# Patient Record
Sex: FEMALE | Race: WHITE | NOT HISPANIC OR LATINO | URBAN - METROPOLITAN AREA
[De-identification: names, ages, dates, MRNs, and addresses within clinical notes are randomized per-mention and may not be internally consistent; named-entity substitution may affect disease eponyms.]

---

## 2017-04-26 ENCOUNTER — TELEPHONE (OUTPATIENT)
Dept: TRANSPLANT | Facility: CLINIC | Age: 37
End: 2017-04-26

## 2017-04-27 NOTE — TELEPHONE ENCOUNTER
Left message for Gema telling her we received her questionaire. Large response for this recip and need to carefully go through everyone. Once we need to proceed with Gema we will contact her. Has hx Gastric Bypass. No pkt sent.

## 2017-05-03 ENCOUNTER — TELEPHONE (OUTPATIENT)
Dept: TRANSPLANT | Facility: CLINIC | Age: 37
End: 2017-05-03

## 2017-05-03 NOTE — TELEPHONE ENCOUNTER
"Living Kidney Donor Evaluation Completed: 2017 20:59:51 CT Updated: 2017 10:29:27 CT  Donor Name: Gema Jennings MRN: Note: : 1980 Age: 36Gender: Female Donor Height: 5  7\" Weight (lb): 175 BMI: 27.4  Donor Race:  Ethnicity: Not / Donor Preferred Language: English  Required?: No Current Marital Status:   Demographics: Home Address: [address] [place] [address] City: [place] State: [place] Zip: [address] Country: [place]  Best Phone: [phone] Alt Phone: [phone] Donor Email: [email or web address] Phone Type: Mobile Alt Phone Type: Work - Private  Preferred Contact Time(s): 09:00 AM-11:00 AM Preferred Contact Day(s): Mon, Tue, Wed, Thur, Fri  Donor Screen: PASSED Donor Referred by: Social Media Donor self reported ABO: O  Recipient Information: Recipient Name (Last, First): Jonathan Castaneda Recipient :    2004  ... Donor Relationship: Met through social media Recipient Diagnosis: Recipient ABO:   MEDICAL HISTORY:  Asthma ( no Intubations, no Hospitalizations, no Steroid Use )  Depression  Insomnia  Migraine Headaches  MEDICATIONS:  Albuterol Inhaler  Lunesta  Multivitamin with Iron  Topiramate  Zoloft  SURGICAL HISTORY:  Gastric Bypass, NOS  Oral Surgery, NOS  Tubal Ligation  ALLERGIES:  NKDA  SOCIAL HISTORY:  EtOH: Rare (1-2 drinks/year)  Illicit Drug Use: Denies  Tobacco: Denies  SELF-REPORTED FUNCTIONAL STATUS:  \"I am able to participate in strenuous sports such as swimming, singles tennis, football, basketball, or skiing\"  Exercise (3 X per week)  REVIEW OF ORGAN SYSTEMS: Airway or Lungs: Yes Blood Disorder: No Cancer: No Diabetes,Thyroid,Adrenal,Endocrine Disorder: No Digestive or Liver: No Female Health: No Heart or Circulatory System: No Immune Diseases: No Kidneys and Bladder: No Muscles,Bones,Joints: No Neuro: Yes Psych: Yes  FAMILY HISTORY: Confirmed:  Cancer (Aunt or Uncle, Father, Grandparent)  Heart Disease (Father, " Grandparent)  Denied:  Diabetes (denies)  Hypertension (denies)  Kidney Disease (denies)  Kidney Stones (denies)  DONOR INFORMATION:  Level of Education: High school or secondary school degree complete Employment Status: Full Time Employer: [employer] Medical Insurance Status: Has medical insurance Current Accommodation: Owns own home/apartment Living Arrangement: With spouse Allow Disclosure to Recipient: Yes Paired Kidney Exchange Education Level: General idea Paired Kidney Exchange Participation Consent: Yes, for a better match Donor Motivation: Highly motivated donor  HIGH RISK BEHAVIOR:  Blood transfusion < 12 months. (NO)  Commercial sex < 12 months. (NO)  Illicit IV drug use < 5yrs. (NO)  Other high risk sexual contact < 12 months. (NO)  EMERGENCY CONTACT INFORMATION:  Primary Secondary First Name: Jimenez Last Name: Sheeba Phone Number: [phone] Relationship: Mother  First Name: Benjie  Last Name: Michaela Phone Number: [phone] Relationship: Spouse  REASON FOR DONATION:   If my child was sick I would want someone to try and help them   PHYSICIAN CONTACT INFORMATION:  PCP  Name: [facility]   City: [place] State: [place]  Phone: [phone]